# Patient Record
Sex: MALE | Race: WHITE | Employment: OTHER | URBAN - METROPOLITAN AREA
[De-identification: names, ages, dates, MRNs, and addresses within clinical notes are randomized per-mention and may not be internally consistent; named-entity substitution may affect disease eponyms.]

---

## 2017-01-04 ENCOUNTER — OFFICE VISIT (OUTPATIENT)
Dept: INTERNAL MEDICINE | Facility: CLINIC | Age: 67
End: 2017-01-04
Payer: COMMERCIAL

## 2017-01-04 VITALS
WEIGHT: 205 LBS | TEMPERATURE: 98.4 F | OXYGEN SATURATION: 97 % | SYSTOLIC BLOOD PRESSURE: 136 MMHG | DIASTOLIC BLOOD PRESSURE: 74 MMHG | HEART RATE: 88 BPM | HEIGHT: 69 IN | BODY MASS INDEX: 30.36 KG/M2

## 2017-01-04 DIAGNOSIS — Z23 ENCOUNTER FOR IMMUNIZATION: ICD-10-CM

## 2017-01-04 DIAGNOSIS — Z87.19 HISTORY OF BARRETT'S ESOPHAGUS: ICD-10-CM

## 2017-01-04 DIAGNOSIS — K52.9 GASTROENTERITIS: Primary | ICD-10-CM

## 2017-01-04 PROCEDURE — 90471 IMMUNIZATION ADMIN: CPT | Performed by: INTERNAL MEDICINE

## 2017-01-04 PROCEDURE — 90670 PCV13 VACCINE IM: CPT | Performed by: INTERNAL MEDICINE

## 2017-01-04 PROCEDURE — 99213 OFFICE O/P EST LOW 20 MIN: CPT | Mod: 25 | Performed by: INTERNAL MEDICINE

## 2017-01-04 ASSESSMENT — ANXIETY QUESTIONNAIRES
6. BECOMING EASILY ANNOYED OR IRRITABLE: NEARLY EVERY DAY
2. NOT BEING ABLE TO STOP OR CONTROL WORRYING: NOT AT ALL
3. WORRYING TOO MUCH ABOUT DIFFERENT THINGS: NOT AT ALL
5. BEING SO RESTLESS THAT IT IS HARD TO SIT STILL: NOT AT ALL
GAD7 TOTAL SCORE: 3
1. FEELING NERVOUS, ANXIOUS, OR ON EDGE: NOT AT ALL
IF YOU CHECKED OFF ANY PROBLEMS ON THIS QUESTIONNAIRE, HOW DIFFICULT HAVE THESE PROBLEMS MADE IT FOR YOU TO DO YOUR WORK, TAKE CARE OF THINGS AT HOME, OR GET ALONG WITH OTHER PEOPLE: NOT DIFFICULT AT ALL
7. FEELING AFRAID AS IF SOMETHING AWFUL MIGHT HAPPEN: NOT AT ALL

## 2017-01-04 ASSESSMENT — PATIENT HEALTH QUESTIONNAIRE - PHQ9: 5. POOR APPETITE OR OVEREATING: NOT AT ALL

## 2017-01-04 NOTE — PROGRESS NOTES
"  SUBJECTIVE:                                                    Chema Ames is a 66 year old male who presents to clinic today for the following health issues:    Medication Followup of Omperazole    Taking Medication as prescribed: yes    Side Effects:  None     Medication Helping Symptoms:  yes           Problem list and histories reviewed & adjusted, as indicated.  Additional history:     Patient also here for follow-up hypertension after starting amlodipine this past fall.  Today's blood pressure noted.    ROS:  Constitutional, HEENT, cardiovascular, pulmonary, gi and gu systems are negative, except as otherwise noted.    OBJECTIVE:                                                    /74 mmHg  Pulse 88  Temp(Src) 98.4  F (36.9  C) (Oral)  Ht 5' 9\" (1.753 m)  Wt 205 lb (92.987 kg)  BMI 30.26 kg/m2  SpO2 97%  Body mass index is 30.26 kg/(m^2).  GENERAL: healthy, alert and no distress  NECK: no adenopathy, no asymmetry, masses, or scars and thyroid normal to palpation  RESP: lungs clear to auscultation - no rales, rhonchi or wheezes  CV: regular rate and rhythm, normal S1 S2, no S3 or S4, no murmur, click or rub, no peripheral edema and peripheral pulses strong  ABDOMEN: soft, nontender, no hepatosplenomegaly, no masses and bowel sounds normal  MS: no gross musculoskeletal defects noted, no edema    Diagnostic Test Results:  none      ASSESSMENT/PLAN:                                                      1. Gastroenteritis    - omeprazole (PRILOSEC) 20 MG CR capsule; Take 1 capsule (20 mg) by mouth daily  Dispense: 90 capsule; Refill: prn    2. History of Doyle's esophagus      3. Encounter for immunization    - PNEUMOCOCCAL CONJ VACCINE 13 VALENT IM (PREVNAR 13)        Josh Tai MD  [facility]    "

## 2017-01-04 NOTE — NURSING NOTE
"Chief Complaint   Patient presents with     Gastrophageal Reflux     med refill       Initial /74 mmHg  Pulse 88  Temp(Src) 98.4  F (36.9  C) (Oral)  Ht 5' 9\" (1.753 m)  Wt 205 lb (92.987 kg)  BMI 30.26 kg/m2  SpO2 97% Estimated body mass index is 30.26 kg/(m^2) as calculated from the following:    Height as of this encounter: 5' 9\" (1.753 m).    Weight as of this encounter: 205 lb (92.987 kg).  BP completed using cuff size: herman Mayorga CMA      "

## 2017-01-05 ASSESSMENT — PATIENT HEALTH QUESTIONNAIRE - PHQ9: SUM OF ALL RESPONSES TO PHQ QUESTIONS 1-9: 1

## 2017-01-05 ASSESSMENT — ANXIETY QUESTIONNAIRES: GAD7 TOTAL SCORE: 3

## 2017-04-28 DIAGNOSIS — I10 ESSENTIAL HYPERTENSION WITH GOAL BLOOD PRESSURE LESS THAN 140/90: ICD-10-CM

## 2017-04-28 RX ORDER — AMLODIPINE BESYLATE 10 MG/1
TABLET ORAL
Qty: 30 TABLET | Refills: 9 | Status: SHIPPED | OUTPATIENT
Start: 2017-04-28

## 2017-04-28 NOTE — TELEPHONE ENCOUNTER
amLODIPine (NORVASC) 10 MG tablet      Last Written Prescription Date: 12/12/2016  Last Fill Quantity: 30, # refills: 3    Last Office Visit with [facility], [facility] or [facility] prescribing provider:  1/04/2017   Future Office Visit:        BP Readings from Last 3 Encounters:   01/04/17 136/74   11/27/16 100/60   11/14/16 158/80

## 2017-06-05 DIAGNOSIS — I10 ESSENTIAL HYPERTENSION WITH GOAL BLOOD PRESSURE LESS THAN 140/90: Primary | ICD-10-CM

## 2017-06-05 NOTE — TELEPHONE ENCOUNTER
Reason for Call:  Medication or medication refill:    Do you use a [facility]?  Name of the pharmacy and phone number for the current request:  [facility] [address] [place] - [phone] Fax [phone]    Name of the medication requested lisinopril  40 mg once a day    Other request     Can we leave a detailed message on this number? YES    Phone number patient can be reached at: Home number on file [phone] (home)    Best Time anytime    Call taken on 6/5/2017 at 4:40 PM by Rose Ricketts

## 2017-06-06 RX ORDER — LISINOPRIL 40 MG/1
40 TABLET ORAL DAILY
Qty: 90 TABLET | Refills: 1 | Status: SHIPPED | OUTPATIENT
Start: 2017-06-06 | End: 2017-12-09

## 2017-06-06 NOTE — TELEPHONE ENCOUNTER
lisinopril (PRINIVIL,ZESTRIL) 40 MG tablet      Last Written Prescription Date: 8/15/2016  Last Fill Quantity: 90, # refills: 3    Last Office Visit with [facility], [facility] or [facility] prescribing provider:  1/4/2017   Future Office Visit:        BP Readings from Last 3 Encounters:   01/04/17 136/74   11/27/16 100/60   11/14/16 158/80

## 2017-06-23 ENCOUNTER — TELEPHONE (OUTPATIENT)
Dept: INTERNAL MEDICINE | Facility: CLINIC | Age: 67
End: 2017-06-23

## 2017-09-12 ENCOUNTER — TELEPHONE (OUTPATIENT)
Dept: INTERNAL MEDICINE | Facility: CLINIC | Age: 67
End: 2017-09-12

## 2017-09-12 NOTE — LETTER
[facility]  [address] [address]  [phone]  September 12, 2017    Chema Ames  [address]  [facility] [address]    Dear Chema,    I care about your health and have reviewed your health plan. I have reviewed your medical conditions, medication list, and lab results and am making recommendations based on this review, to better manage your health.    You are in particular need of attention regarding:  -Colon Cancer Screening    I am recommending that you:     -schedule a COLONOSCOPY to look for colon cancer (due every 10 years or 5 years in higher risk situations.)        Colon cancer is now the second leading cause of cancer-related deaths in the [place] for both men and women and there are over 130,000 new cases and 50,000 deaths per year from colon cancer.  Colonoscopies can prevent 90-95% of these deaths.  Problem lesions can be removed before they ever become cancer.  This test is not only looking for cancer, but also getting rid of precancerious lesions.    If you are under/uninsured, we recommend you contact the [employer] program. [employer] is a free colorectal cancer screening program that provides colonoscopies for eligible under/uninsured [place] men and women. If you are interested in receiving a free colonoscopy, please call [employer] at [phone] (mention code [employer]) to see if you re eligible.      If you do not wish to do a colonoscopy or cannot afford to do one, at this time, there is another option. It is called a FIT test or Fecal Immunochemical Occult Blood Test (take home stool sample kit).  It does not replace the colonoscopy for colorectal cancer screening, but it can detect hidden bleeding in the lower colon.  It does need to be repeated every year and if a positive result is obtained, you would be referred for a colonoscopy.          If you have completed either one of these tests  at another facility, please call with the details of when and where the tests were done and if they were normal or not. Or have the records sent to our clinic so that we can best coordinate your care.      Here is a list of Health Maintenance topics that are due now or due soon:  Health Maintenance Due   Topic Date Due     Hepatitis C Screening  11/14/1969     Colon Cancer Screening - every 10 years.  11/14/2001     AORTIC ANEURYSM SCREENING (SYSTEM ASSIGNED)  11/14/2016     Flu Vaccine - yearly  09/01/2017       Please call us at [phone] or [phone] (or use [employer]) to address the above recommendations.     Thank you for trusting [facility].  We appreciate the opportunity to serve you and look forward to supporting your healthcare needs in the future.    If you have (or plan to have) any of these tests done at a facility other than a [facility] or a [facility], please have the results from these tests sent to your primary physician at [facility]..    Healthy Regards,    Aaron Tai MD

## 2017-12-09 DIAGNOSIS — I10 ESSENTIAL HYPERTENSION WITH GOAL BLOOD PRESSURE LESS THAN 140/90: ICD-10-CM

## 2017-12-09 NOTE — LETTER
[facility]  [address] [address]  [phone]        February 5, 2019    Chema Ames  [address]  [facility] [address]              Dear Chema Ames    This is to remind you that your non-fasting labs is due.    You may call our office at [phone] to schedule an appointment.    Please disregard this notice if you have already had your labs drawn or made an appointment.        Sincerely,        Josh Tai MD

## 2017-12-09 NOTE — LETTER
[facility]  [address] [id number] [phone]            Chema Ames  [address]  [facility] [address]        December 11, 2017    Dear Chema,    While refilling your prescription today, we noticed that you are due to have labs drawn NONFASTING .  We will refill your prescription for 30 days, but a follow-up appointment must be made before any additional refills can be approved.     Taking care of your health is important to us and we look forward to seeing you in the near future.  Please call us at [phone] or [phone] (or use [email or web address]) to schedule an appointment.     Please disregard this notice if you have already made an appointment.    Sincerely,        [facility]

## 2017-12-11 RX ORDER — LISINOPRIL 40 MG/1
TABLET ORAL
Qty: 30 TABLET | Refills: 0 | Status: SHIPPED | OUTPATIENT
Start: 2017-12-11

## 2018-02-06 ENCOUNTER — RELEASE OF INFORMATION (OUTPATIENT)
Dept: INTERNAL MEDICINE | Facility: CLINIC | Age: 68
End: 2018-02-06

## 2019-03-11 ENCOUNTER — TELEPHONE (OUTPATIENT)
Dept: LAB | Facility: CLINIC | Age: 69
End: 2019-03-11

## 2019-12-02 ENCOUNTER — TRANSFERRED RECORDS (OUTPATIENT)
Dept: HEALTH INFORMATION MANAGEMENT | Facility: CLINIC | Age: 69
End: 2019-12-02

## 2019-12-10 ENCOUNTER — TRANSFERRED RECORDS (OUTPATIENT)
Dept: HEALTH INFORMATION MANAGEMENT | Facility: CLINIC | Age: 69
End: 2019-12-10

## 2019-12-30 ENCOUNTER — HOSPITAL ENCOUNTER (OUTPATIENT)
Dept: CARDIAC REHAB | Facility: CLINIC | Age: 69
End: 2019-12-30
Attending: INTERNAL MEDICINE
Payer: COMMERCIAL

## 2019-12-30 PROCEDURE — 40000116 ZZH STATISTIC OP CR VISIT: Performed by: OCCUPATIONAL THERAPIST

## 2019-12-30 PROCEDURE — 93798 PHYS/QHP OP CAR RHAB W/ECG: CPT | Performed by: OCCUPATIONAL THERAPIST

## 2019-12-30 PROCEDURE — 40000575 ZZH STATISTIC OP CARDIAC VISIT #2: Performed by: OCCUPATIONAL THERAPIST

## 2019-12-30 PROCEDURE — 93797 PHYS/QHP OP CAR RHAB WO ECG: CPT | Performed by: OCCUPATIONAL THERAPIST

## 2020-01-03 ENCOUNTER — HOSPITAL ENCOUNTER (OUTPATIENT)
Dept: CARDIAC REHAB | Facility: CLINIC | Age: 70
End: 2020-01-03
Attending: INTERNAL MEDICINE
Payer: COMMERCIAL

## 2020-01-03 PROCEDURE — 40000116 ZZH STATISTIC OP CR VISIT

## 2020-01-03 PROCEDURE — 93798 PHYS/QHP OP CAR RHAB W/ECG: CPT

## 2020-01-06 ENCOUNTER — HOSPITAL ENCOUNTER (OUTPATIENT)
Dept: CARDIAC REHAB | Facility: CLINIC | Age: 70
End: 2020-01-06
Attending: INTERNAL MEDICINE
Payer: COMMERCIAL

## 2020-01-06 PROCEDURE — 40000116 ZZH STATISTIC OP CR VISIT

## 2020-01-06 PROCEDURE — 93798 PHYS/QHP OP CAR RHAB W/ECG: CPT

## 2020-01-10 ENCOUNTER — HOSPITAL ENCOUNTER (OUTPATIENT)
Dept: CARDIAC REHAB | Facility: CLINIC | Age: 70
End: 2020-01-10
Attending: INTERNAL MEDICINE
Payer: COMMERCIAL

## 2020-01-10 PROCEDURE — 40000116 ZZH STATISTIC OP CR VISIT: Performed by: REHABILITATION PRACTITIONER

## 2020-01-10 PROCEDURE — 93798 PHYS/QHP OP CAR RHAB W/ECG: CPT | Performed by: REHABILITATION PRACTITIONER

## 2020-01-13 ENCOUNTER — HOSPITAL ENCOUNTER (OUTPATIENT)
Dept: CARDIAC REHAB | Facility: CLINIC | Age: 70
End: 2020-01-13
Attending: INTERNAL MEDICINE
Payer: COMMERCIAL

## 2020-01-13 PROCEDURE — 93797 PHYS/QHP OP CAR RHAB WO ECG: CPT | Mod: 59

## 2020-01-13 PROCEDURE — 93798 PHYS/QHP OP CAR RHAB W/ECG: CPT

## 2020-01-13 PROCEDURE — 40000575 ZZH STATISTIC OP CARDIAC VISIT #2

## 2020-01-13 PROCEDURE — 40000116 ZZH STATISTIC OP CR VISIT

## 2020-01-17 ENCOUNTER — HOSPITAL ENCOUNTER (OUTPATIENT)
Dept: CARDIAC REHAB | Facility: CLINIC | Age: 70
End: 2020-01-17
Attending: INTERNAL MEDICINE
Payer: COMMERCIAL

## 2020-01-17 PROCEDURE — 93798 PHYS/QHP OP CAR RHAB W/ECG: CPT

## 2020-01-17 PROCEDURE — 40000116 ZZH STATISTIC OP CR VISIT

## 2020-02-03 ENCOUNTER — HOSPITAL ENCOUNTER (OUTPATIENT)
Dept: CARDIAC REHAB | Facility: CLINIC | Age: 70
End: 2020-02-03
Attending: INTERNAL MEDICINE
Payer: COMMERCIAL

## 2020-02-03 PROCEDURE — 40000116 ZZH STATISTIC OP CR VISIT

## 2020-02-03 PROCEDURE — 93798 PHYS/QHP OP CAR RHAB W/ECG: CPT

## 2020-02-05 ENCOUNTER — HOSPITAL ENCOUNTER (OUTPATIENT)
Dept: CARDIAC REHAB | Facility: CLINIC | Age: 70
End: 2020-02-05
Attending: INTERNAL MEDICINE
Payer: COMMERCIAL

## 2020-02-05 PROCEDURE — 40000116 ZZH STATISTIC OP CR VISIT

## 2020-02-05 PROCEDURE — 40000575 ZZH STATISTIC OP CARDIAC VISIT #2

## 2020-02-05 PROCEDURE — 93797 PHYS/QHP OP CAR RHAB WO ECG: CPT

## 2020-02-05 PROCEDURE — 93798 PHYS/QHP OP CAR RHAB W/ECG: CPT

## 2020-02-12 ENCOUNTER — HOSPITAL ENCOUNTER (OUTPATIENT)
Dept: CARDIAC REHAB | Facility: CLINIC | Age: 70
End: 2020-02-12
Attending: INTERNAL MEDICINE
Payer: COMMERCIAL

## 2020-02-12 PROCEDURE — 40000575 ZZH STATISTIC OP CARDIAC VISIT #2

## 2020-02-12 PROCEDURE — 93797 PHYS/QHP OP CAR RHAB WO ECG: CPT

## 2020-02-12 PROCEDURE — 40000116 ZZH STATISTIC OP CR VISIT

## 2020-02-12 PROCEDURE — 93798 PHYS/QHP OP CAR RHAB W/ECG: CPT

## 2020-02-14 ENCOUNTER — HOSPITAL ENCOUNTER (OUTPATIENT)
Dept: CARDIAC REHAB | Facility: CLINIC | Age: 70
End: 2020-02-14
Attending: INTERNAL MEDICINE
Payer: COMMERCIAL

## 2020-02-14 PROCEDURE — 40000116 ZZH STATISTIC OP CR VISIT

## 2020-02-14 PROCEDURE — 93798 PHYS/QHP OP CAR RHAB W/ECG: CPT

## 2020-02-17 ENCOUNTER — HOSPITAL ENCOUNTER (OUTPATIENT)
Dept: CARDIAC REHAB | Facility: CLINIC | Age: 70
End: 2020-02-17
Attending: INTERNAL MEDICINE
Payer: COMMERCIAL

## 2020-02-17 PROCEDURE — 93797 PHYS/QHP OP CAR RHAB WO ECG: CPT

## 2020-02-17 PROCEDURE — 93798 PHYS/QHP OP CAR RHAB W/ECG: CPT

## 2020-02-17 PROCEDURE — 40000575 ZZH STATISTIC OP CARDIAC VISIT #2

## 2020-02-17 PROCEDURE — 40000116 ZZH STATISTIC OP CR VISIT

## 2020-02-21 ENCOUNTER — HOSPITAL ENCOUNTER (OUTPATIENT)
Dept: CARDIAC REHAB | Facility: CLINIC | Age: 70
End: 2020-02-21
Attending: INTERNAL MEDICINE
Payer: COMMERCIAL

## 2020-02-21 PROCEDURE — 93798 PHYS/QHP OP CAR RHAB W/ECG: CPT

## 2020-02-21 PROCEDURE — 40000116 ZZH STATISTIC OP CR VISIT

## 2020-02-24 ENCOUNTER — HOSPITAL ENCOUNTER (OUTPATIENT)
Dept: CARDIAC REHAB | Facility: CLINIC | Age: 70
End: 2020-02-24
Attending: INTERNAL MEDICINE
Payer: COMMERCIAL

## 2020-02-24 PROCEDURE — 40000116 ZZH STATISTIC OP CR VISIT

## 2020-02-24 PROCEDURE — 93798 PHYS/QHP OP CAR RHAB W/ECG: CPT

## 2020-02-26 ENCOUNTER — HOSPITAL ENCOUNTER (OUTPATIENT)
Dept: CARDIAC REHAB | Facility: CLINIC | Age: 70
End: 2020-02-26
Attending: INTERNAL MEDICINE
Payer: COMMERCIAL

## 2020-02-26 PROCEDURE — 93798 PHYS/QHP OP CAR RHAB W/ECG: CPT

## 2020-02-26 PROCEDURE — 40000116 ZZH STATISTIC OP CR VISIT

## 2020-02-28 ENCOUNTER — HOSPITAL ENCOUNTER (OUTPATIENT)
Dept: CARDIAC REHAB | Facility: CLINIC | Age: 70
End: 2020-02-28
Attending: INTERNAL MEDICINE
Payer: COMMERCIAL

## 2020-02-28 PROCEDURE — 40000116 ZZH STATISTIC OP CR VISIT

## 2020-02-28 PROCEDURE — 93798 PHYS/QHP OP CAR RHAB W/ECG: CPT

## 2020-03-02 ENCOUNTER — HOSPITAL ENCOUNTER (OUTPATIENT)
Dept: CARDIAC REHAB | Facility: CLINIC | Age: 70
End: 2020-03-02
Attending: INTERNAL MEDICINE
Payer: COMMERCIAL

## 2020-03-02 PROCEDURE — 40000116 ZZH STATISTIC OP CR VISIT

## 2020-03-02 PROCEDURE — 93798 PHYS/QHP OP CAR RHAB W/ECG: CPT

## 2020-03-02 PROCEDURE — 40000575 ZZH STATISTIC OP CARDIAC VISIT #2

## 2020-03-02 PROCEDURE — 93797 PHYS/QHP OP CAR RHAB WO ECG: CPT

## 2020-03-06 ENCOUNTER — HOSPITAL ENCOUNTER (OUTPATIENT)
Dept: CARDIAC REHAB | Facility: CLINIC | Age: 70
End: 2020-03-06
Attending: INTERNAL MEDICINE
Payer: COMMERCIAL

## 2020-03-06 PROCEDURE — 40000116 ZZH STATISTIC OP CR VISIT

## 2020-03-06 PROCEDURE — 93798 PHYS/QHP OP CAR RHAB W/ECG: CPT

## 2020-03-09 ENCOUNTER — HOSPITAL ENCOUNTER (OUTPATIENT)
Dept: CARDIAC REHAB | Facility: CLINIC | Age: 70
End: 2020-03-09
Attending: INTERNAL MEDICINE
Payer: COMMERCIAL

## 2020-03-09 PROCEDURE — 93798 PHYS/QHP OP CAR RHAB W/ECG: CPT

## 2020-03-09 PROCEDURE — 40000116 ZZH STATISTIC OP CR VISIT

## 2020-03-11 ENCOUNTER — HOSPITAL ENCOUNTER (OUTPATIENT)
Dept: CARDIAC REHAB | Facility: CLINIC | Age: 70
End: 2020-03-11
Attending: INTERNAL MEDICINE
Payer: COMMERCIAL

## 2020-03-11 PROCEDURE — 40000116 ZZH STATISTIC OP CR VISIT

## 2020-03-11 PROCEDURE — 93798 PHYS/QHP OP CAR RHAB W/ECG: CPT

## 2021-01-07 ENCOUNTER — TRANSFERRED RECORDS (OUTPATIENT)
Dept: HEALTH INFORMATION MANAGEMENT | Facility: CLINIC | Age: 71
End: 2021-01-07

## 2021-04-28 ENCOUNTER — TRANSFERRED RECORDS (OUTPATIENT)
Dept: HEALTH INFORMATION MANAGEMENT | Facility: CLINIC | Age: 71
End: 2021-04-28

## 2021-05-20 ENCOUNTER — TRANSFERRED RECORDS (OUTPATIENT)
Dept: HEALTH INFORMATION MANAGEMENT | Facility: CLINIC | Age: 71
End: 2021-05-20

## 2021-06-03 ENCOUNTER — TRANSCRIBE ORDERS (OUTPATIENT)
Dept: OTHER | Age: 71
End: 2021-06-03

## 2021-06-04 ENCOUNTER — TRANSCRIBE ORDERS (OUTPATIENT)
Dept: OTHER | Age: 71
End: 2021-06-04

## 2021-06-04 DIAGNOSIS — Z95.5 S/P DRUG ELUTING CORONARY STENT PLACEMENT: Primary | ICD-10-CM

## 2021-06-09 ENCOUNTER — TRANSFERRED RECORDS (OUTPATIENT)
Dept: HEALTH INFORMATION MANAGEMENT | Facility: CLINIC | Age: 71
End: 2021-06-09

## 2021-06-16 ENCOUNTER — HOSPITAL ENCOUNTER (OUTPATIENT)
Dept: CARDIAC REHAB | Facility: CLINIC | Age: 71
End: 2021-06-16
Payer: COMMERCIAL

## 2021-06-16 DIAGNOSIS — Z95.5 S/P DRUG ELUTING CORONARY STENT PLACEMENT: ICD-10-CM

## 2021-06-16 PROCEDURE — 93797 PHYS/QHP OP CAR RHAB WO ECG: CPT | Mod: XU

## 2021-06-16 PROCEDURE — 93798 PHYS/QHP OP CAR RHAB W/ECG: CPT

## 2021-06-21 ENCOUNTER — HOSPITAL ENCOUNTER (OUTPATIENT)
Dept: CARDIAC REHAB | Facility: CLINIC | Age: 71
End: 2021-06-21
Attending: INTERNAL MEDICINE
Payer: COMMERCIAL

## 2021-06-21 PROCEDURE — 93798 PHYS/QHP OP CAR RHAB W/ECG: CPT

## 2021-06-22 ENCOUNTER — OFFICE VISIT (OUTPATIENT)
Dept: UROLOGY | Facility: CLINIC | Age: 71
End: 2021-06-22
Payer: COMMERCIAL

## 2021-06-22 VITALS
BODY MASS INDEX: 30.06 KG/M2 | SYSTOLIC BLOOD PRESSURE: 138 MMHG | DIASTOLIC BLOOD PRESSURE: 70 MMHG | HEIGHT: 70 IN | WEIGHT: 210 LBS | HEART RATE: 84 BPM

## 2021-06-22 DIAGNOSIS — C61 PROSTATE CANCER (H): Primary | ICD-10-CM

## 2021-06-22 DIAGNOSIS — I25.810 CORONARY ARTERY DISEASE INVOLVING AUTOLOGOUS ARTERY CORONARY BYPASS GRAFT WITHOUT ANGINA PECTORIS: ICD-10-CM

## 2021-06-22 PROCEDURE — 99204 OFFICE O/P NEW MOD 45 MIN: CPT | Performed by: STUDENT IN AN ORGANIZED HEALTH CARE EDUCATION/TRAINING PROGRAM

## 2021-06-22 RX ORDER — ROSUVASTATIN CALCIUM 40 MG/1
40 TABLET, COATED ORAL
COMMUNITY
Start: 2021-06-17

## 2021-06-22 RX ORDER — OXYCODONE HYDROCHLORIDE 5 MG/1
TABLET ORAL
COMMUNITY

## 2021-06-22 RX ORDER — OMEPRAZOLE 20 MG/1
20 TABLET, DELAYED RELEASE ORAL
COMMUNITY

## 2021-06-22 RX ORDER — ASPIRIN 81 MG/1
81 TABLET, CHEWABLE ORAL
COMMUNITY
Start: 2019-12-05

## 2021-06-22 RX ORDER — CALCIUM CARBONATE 500(1250)
TABLET,CHEWABLE ORAL
COMMUNITY

## 2021-06-22 RX ORDER — NITROGLYCERIN 0.4 MG/1
0.4 TABLET SUBLINGUAL
COMMUNITY
Start: 2019-12-04

## 2021-06-22 RX ORDER — ALBUTEROL SULFATE 90 UG/1
AEROSOL, METERED RESPIRATORY (INHALATION)
COMMUNITY
Start: 2020-04-20

## 2021-06-22 RX ORDER — LISINOPRIL 5 MG/1
TABLET ORAL
COMMUNITY
Start: 2021-02-11

## 2021-06-22 RX ORDER — ETANERCEPT 50 MG/ML
SOLUTION SUBCUTANEOUS
COMMUNITY
Start: 2021-03-01

## 2021-06-22 RX ORDER — CARVEDILOL 12.5 MG/1
TABLET ORAL
COMMUNITY
Start: 2021-04-23

## 2021-06-22 RX ORDER — ACETAMINOPHEN 325 MG/1
325-650 TABLET ORAL
COMMUNITY
Start: 2019-12-18

## 2021-06-22 RX ORDER — CLOPIDOGREL BISULFATE 75 MG/1
TABLET ORAL
COMMUNITY
Start: 2021-05-27

## 2021-06-22 RX ORDER — FOLIC ACID 1 MG/1
TABLET ORAL
COMMUNITY
Start: 2021-05-20

## 2021-06-22 ASSESSMENT — PAIN SCALES - GENERAL: PAINLEVEL: NO PAIN (0)

## 2021-06-22 ASSESSMENT — MIFFLIN-ST. JEOR: SCORE: 1715.86

## 2021-06-22 NOTE — LETTER
6/22/2021       RE: Chema Ames  [address]  [facility] [address]     Dear Colleague,    Thank you for referring your patient, Chema Ames, to the [facility] [place] at [facility]. Please see a copy of my visit note below.          Chief Complaint:    Prostate Cancer           Consult or Referral:     Mr. Chema Ames is a 70 year old male seen at the request of Dr. Corbin.         History of Present Illness:     Chema Ames is a 70 year old male being seen for second opinion for prostate cancer.  Duration of problem: 2 months  Previous treatments: Prostate biopsy with [place] [facility]     accompanied by his spouse:  Reviewed previous notes from Dr. Morrow    Was being seen by urologist at outside hospital for a prostate nodule which was biopsied and found to have Dowell 3+4=7 prostate cancer in 3 out of 6 cores on the right side and Irving six cancer on one of the cores in the left side. He has significant cardiac history he has undergone multiple CABGs in the past  and recently has undergone a stenting with a drug-eluting stent in May he has been chronic anticoagulation with Plavix for almost 1 year. He is here to seek second opinion as he has been referred for hormones with radiation therapy possible.             Past Medical History:     Past Medical History:   Diagnosis Date     GERD with esophagitis      History of Doyle's esophagus      HTN (hypertension)      Mumps      Obstructive sleep apnea      Palpitations      Prostate cancer (H)             Past Surgical History:     Past Surgical History:   Procedure Laterality Date     CARDIAC SURGERY       HERNIA REPAIR, INGUINAL RT/LT  11/2010    Left     SURGICAL HISTORY OF -       Excision of BCC of scalp            Medications     Current Outpatient Medications   Medication     acetaminophen (TYLENOL) 325 MG tablet     albuterol (VENTOLIN HFA) 108 (90  Base) MCG/ACT inhaler     aspirin (ASA) 81 MG chewable tablet     carvedilol (COREG) 12.5 MG tablet     clopidogrel (PLAVIX) 75 MG tablet     Etanercept (ENBREL MINI) 50 MG/ML SOCT     folic acid (FOLVITE) 1 MG tablet     lisinopril (ZESTRIL) 5 MG tablet     methotrexate 2.5 MG tablet     nitroGLYcerin (NITROSTAT) 0.4 MG sublingual tablet     rosuvastatin (CRESTOR) 40 MG tablet     amLODIPine (NORVASC) 10 MG tablet     calcium carbonate 1250 (500 Ca) MG CHEW     Calcium Carbonate Antacid (TUMS PO)     lisinopril (PRINIVIL/ZESTRIL) 40 MG tablet     omeprazole (PRILOSEC OTC) 20 MG EC tablet     omeprazole (PRILOSEC) 20 MG CR capsule     oxyCODONE (ROXICODONE) 5 MG tablet     ranitidine (ZANTAC) 150 MG tablet     Sildenafil Citrate (VIAGRA PO)     No current facility-administered medications for this visit.             Family History:     Family History   Problem Relation Age of Onset     Coronary Artery Disease Father          age 63, heart disease     Breast Cancer Mother      Lymphoma Mother             Social History:     Social History     Socioeconomic History     Marital status:      Spouse name: Not on file     Number of children: 0     Years of education: Not on file     Highest education level: Not on file   Occupational History     Employer: SELF   Social Needs     Financial resource strain: Not on file     Food insecurity     Worry: Not on file     Inability: Not on file     Transportation needs     Medical: Not on file     Non-medical: Not on file   Tobacco Use     Smoking status: Former Smoker     Packs/day: 0.50     Years: 20.00     Pack years: 10.00     Quit date: 2/10/2009     Years since quittin.4     Smokeless tobacco: Never Used   Substance and Sexual Activity     Alcohol use: Yes     Alcohol/week: 0.0 standard drinks     Comment: Socially     Drug use: No     Sexual activity: Not Currently   Lifestyle     Physical activity     Days per week: Not on file     Minutes per session: Not  "on file     Stress: Not on file   Relationships     Social connections     Talks on phone: Not on file     Gets together: Not on file     Attends Voodoo service: Not on file     Active member of club or organization: Not on file     Attends meetings of clubs or organizations: Not on file     Relationship status: Not on file     Intimate partner violence     Fear of current or ex partner: Not on file     Emotionally abused: Not on file     Physically abused: Not on file     Forced sexual activity: Not on file   Other Topics Concern     Parent/sibling w/ CABG, MI or angioplasty before 65F 55M? Not Asked   Social History Narrative     Not on file            Allergies:   Hctz [hydrochlorothiazide]         Review of Systems:  From intake questionnaire     Skin: negative  Eyes: negative  Ears/Nose/Throat: negative  Respiratory: No shortness of breath, dyspnea on exertion, cough, or hemoptysis  Cardiovascular: No chest pain or palpitations  Gastrointestinal: negative; no nausea/vomiting, constipation or diarrhea  Genitourinary: as per HPI  Musculoskeletal: negative  Neurologic: negative  Psychiatric: negative  Hematologic/Lymphatic/Immunologic: negative  Endocrine: negative         Physical Exam:     Patient is a 70 year old  male   Vitals: Blood pressure 138/70, pulse 84, height 1.765 m (5' 9.5\"), weight 95.3 kg (210 lb).  Constitutional: Body mass index is 30.57 kg/m .  Alert, no acute distress, oriented, conversant  Eyes: no scleral icterus; extraocular muscles intact, moist conjunctivae  Neck: trachea midline, no thyromegaly  Ears/nose/mouth: throat/mouth:normal, good dentition  Respiratory: no respiratory distress, or pursed lip breathing  Cardiovascular: pulses strong and intact; no obvious jugular venous distension present  Gastrointestinal: soft, nontender, no organomegaly or masses,   Lymphatics: No inguinal adenopathy  Musculoskeletal: extremities normal, no peripheral edema  Skin: no suspicious lesions or " rashes  Neuro: Alert, oriented, speech and mentation normal  Psych: affect and mood normal, alert and oriented to person, place and time  Gait: Normal  : deferred,       Labs and Pathology:    The following labs were reviewed by me and discussed with the patient:    Significant for   Lab Results   Component Value Date    CR 1.02 11/07/2016    CR 1.16 08/19/2016    CR 0.77 02/10/2011    CR 0.82 12/18/2010    CR 0.80 12/15/2010     PSA: 2.42  Surgical pathology:  Irving six six cores on the left Menlo 3+4 = 73 cores on the right        Imaging:    The following imaging exams were independently viewed and interpreted by me and discussed with patient:  CT Scan Abd/Pelvis: Normal  Bone scan: Normal               Assessment and Plan:     Prostate cancer (H)  We discussed about the significance of favorable intermediate risk prostate cancer and the fact that he has significant cardiac disease limiting him for major surgical intervention for at least the next 3 months. He is already being seen in consult for hormone he reviewed by an outside neurologist and is here to seek second. I discussed with him about the options available to him including active surveillance surgery and radiation therapy as well as focal therapy. I advised him to get his pathology review here with stenosis get a decipher genomic testing on prostate biopsy specimen. I believe that if he undergoes  radiation therapy he may be a candidate not getting hormones in with you of his good risk disease but in the event there is a high yearly genomic classification additional hormones may be beneficial to him. We also discussed about benefits and the adverse effects of hormones and how that may have a significant impact on him.he is going to see a radiation oncologist very soon and I I believe this information will be helpful for him  - Pathology Consult; Future  - Decipher Prostate Cancer Test: Clinic Lab Order; Future    Coronary artery disease involving  autologous artery coronary bypass graft without angina pectoris          Plan:  Advised for decipher testing and review here at the emergency room    Orders  Orders Placed This Encounter   Procedures     Pathology Consult     Decipher Prostate Cancer Test: Clinic Lab Order       Anupam Frausto MD  [facility] [place]      ==========================    Additional Billing and Coding Information:  Review of external notes as documented above   Review of the result(s) of each unique test - PSA bone scan, CT    Independent interpretation of a test performed by another physician/other qualified health care professional (not separately reported) -surgical pathology    Discussion of management or test interpretation with external physician/other qualified healthcare professional/appropriate source -N/A    Diagnosis or treatment significantly limited by social determinants of health -N/A    35 minutes spent on the date of the encounter doing chart review, review of outside records, review of test results, interpretation of tests, patient visit, documentation and discussion with family     ==========================

## 2021-06-23 ENCOUNTER — HOSPITAL ENCOUNTER (OUTPATIENT)
Dept: CARDIAC REHAB | Facility: CLINIC | Age: 71
End: 2021-06-23
Attending: INTERNAL MEDICINE
Payer: COMMERCIAL

## 2021-06-23 PROCEDURE — 93798 PHYS/QHP OP CAR RHAB W/ECG: CPT | Performed by: REHABILITATION PRACTITIONER

## 2021-06-28 ENCOUNTER — HOSPITAL ENCOUNTER (OUTPATIENT)
Dept: CARDIAC REHAB | Facility: CLINIC | Age: 71
End: 2021-06-28
Attending: INTERNAL MEDICINE
Payer: COMMERCIAL

## 2021-06-28 PROCEDURE — 93798 PHYS/QHP OP CAR RHAB W/ECG: CPT

## 2021-06-30 ENCOUNTER — DOCUMENTATION ONLY (OUTPATIENT)
Dept: UROLOGY | Facility: CLINIC | Age: 71
End: 2021-06-30

## 2021-06-30 NOTE — PROGRESS NOTES
Action 06/30/2021 JTV 2:17pm   Action Taken CSS sent a request for Pathology Slides to [facility].        Action 07/08/2021 JTV 3:05pm   Action Taken CSs received Pathology slides from [facility]. 5 slides were accounted for and sent to Pathology on 5th floor for processing.

## 2021-07-04 NOTE — PROGRESS NOTES
Chief Complaint:    Prostate Cancer           Consult or Referral:     Mr. Chema Ames is a 70 year old male seen at the request of Dr. Corbin.         History of Present Illness:     Chema Ames is a 70 year old male being seen for second opinion for prostate cancer.  Duration of problem: 2 months  Previous treatments: Prostate biopsy with [facility]     accompanied by his spouse:  Reviewed previous notes from Dr. Morrow    Was being seen by urologist at outside hospital for a prostate nodule which was biopsied and found to have [place] 3+4=7 prostate cancer in 3 out of 6 cores on the right side and [place] six cancer on one of the cores in the left side. He has significant cardiac history he has undergone multiple CABGs in the past  and recently has undergone a stenting with a drug-eluting stent in May he has been chronic anticoagulation with Plavix for almost 1 year. He is here to seek second opinion as he has been referred for hormones with radiation therapy possible.             Past Medical History:     Past Medical History:   Diagnosis Date     GERD with esophagitis      History of Doyle's esophagus      HTN (hypertension)      Mumps      Obstructive sleep apnea      Palpitations      Prostate cancer (H)             Past Surgical History:     Past Surgical History:   Procedure Laterality Date     CARDIAC SURGERY       HERNIA REPAIR, INGUINAL RT/LT  11/2010    Left     SURGICAL HISTORY OF -       Excision of BCC of scalp            Medications     Current Outpatient Medications   Medication     acetaminophen (TYLENOL) 325 MG tablet     albuterol (VENTOLIN HFA) 108 (90 Base) MCG/ACT inhaler     aspirin (ASA) 81 MG chewable tablet     carvedilol (COREG) 12.5 MG tablet     clopidogrel (PLAVIX) 75 MG tablet     Etanercept (ENBREL MINI) 50 MG/ML SOCT     folic acid (FOLVITE) 1 MG tablet     lisinopril (ZESTRIL) 5 MG tablet     methotrexate 2.5 MG tablet     nitroGLYcerin (NITROSTAT) 0.4 MG  sublingual tablet     rosuvastatin (CRESTOR) 40 MG tablet     amLODIPine (NORVASC) 10 MG tablet     calcium carbonate 1250 (500 Ca) MG CHEW     Calcium Carbonate Antacid (TUMS PO)     lisinopril (PRINIVIL/ZESTRIL) 40 MG tablet     omeprazole (PRILOSEC OTC) 20 MG EC tablet     omeprazole (PRILOSEC) 20 MG CR capsule     oxyCODONE (ROXICODONE) 5 MG tablet     ranitidine (ZANTAC) 150 MG tablet     Sildenafil Citrate (VIAGRA PO)     No current facility-administered medications for this visit.             Family History:     Family History   Problem Relation Age of Onset     Coronary Artery Disease Father          age 63, heart disease     Breast Cancer Mother      Lymphoma Mother             Social History:     Social History     Socioeconomic History     Marital status:      Spouse name: Not on file     Number of children: 0     Years of education: Not on file     Highest education level: Not on file   Occupational History     Employer: SELF   Social Needs     Financial resource strain: Not on file     Food insecurity     Worry: Not on file     Inability: Not on file     Transportation needs     Medical: Not on file     Non-medical: Not on file   Tobacco Use     Smoking status: Former Smoker     Packs/day: 0.50     Years: 20.00     Pack years: 10.00     Quit date: 2/10/2009     Years since quittin.4     Smokeless tobacco: Never Used   Substance and Sexual Activity     Alcohol use: Yes     Alcohol/week: 0.0 standard drinks     Comment: Socially     Drug use: No     Sexual activity: Not Currently   Lifestyle     Physical activity     Days per week: Not on file     Minutes per session: Not on file     Stress: Not on file   Relationships     Social connections     Talks on phone: Not on file     Gets together: Not on file     Attends [employer] service: Not on file     Active member of club or organization: Not on file     Attends meetings of clubs or organizations: Not on file     Relationship status: Not on  "file     Intimate partner violence     Fear of current or ex partner: Not on file     Emotionally abused: Not on file     Physically abused: Not on file     Forced sexual activity: Not on file   Other Topics Concern     Parent/sibling w/ CABG, MI or angioplasty before 65F 55M? Not Asked   Social History Narrative     Not on file            Allergies:   Hctz [hydrochlorothiazide]         Review of Systems:  From intake questionnaire     Skin: negative  Eyes: negative  Ears/Nose/Throat: negative  Respiratory: No shortness of breath, dyspnea on exertion, cough, or hemoptysis  Cardiovascular: No chest pain or palpitations  Gastrointestinal: negative; no nausea/vomiting, constipation or diarrhea  Genitourinary: as per HPI  Musculoskeletal: negative  Neurologic: negative  Psychiatric: negative  Hematologic/Lymphatic/Immunologic: negative  Endocrine: negative         Physical Exam:     Patient is a 70 year old  male   Vitals: Blood pressure 138/70, pulse 84, height 1.765 m (5' 9.5\"), weight 95.3 kg (210 lb).  Constitutional: Body mass index is 30.57 kg/m .  Alert, no acute distress, oriented, conversant  Eyes: no scleral icterus; extraocular muscles intact, moist conjunctivae  Neck: trachea midline, no thyromegaly  Ears/nose/mouth: throat/mouth:normal, good dentition  Respiratory: no respiratory distress, or pursed lip breathing  Cardiovascular: pulses strong and intact; no obvious jugular venous distension present  Gastrointestinal: soft, nontender, no organomegaly or masses,   Lymphatics: No inguinal adenopathy  Musculoskeletal: extremities normal, no peripheral edema  Skin: no suspicious lesions or rashes  Neuro: Alert, oriented, speech and mentation normal  Psych: affect and mood normal, alert and oriented to person, place and time  Gait: Normal  : deferred,       Labs and Pathology:    The following labs were reviewed by me and discussed with the patient:    Significant for   Lab Results   Component Value Date    CR " 1.02 11/07/2016    CR 1.16 08/19/2016    CR 0.77 02/10/2011    CR 0.82 12/18/2010    CR 0.80 12/15/2010     PSA: 2.42  Surgical pathology:  [place] six six cores on the left [place] 3+4 = 73 cores on the right        Imaging:    The following imaging exams were independently viewed and interpreted by me and discussed with patient:  CT Scan Abd/Pelvis: Normal  Bone scan: Normal               Assessment and Plan:     Prostate cancer (H)  We discussed about the significance of favorable intermediate risk prostate cancer and the fact that he has significant cardiac disease limiting him for major surgical intervention for at least the next 3 months. He is already being seen in consult for hormone he reviewed by an outside neurologist and is here to seek second. I discussed with him about the options available to him including active surveillance surgery and radiation therapy as well as focal therapy. I advised him to get his pathology review here with stenosis get a decipher genomic testing on prostate biopsy specimen. I believe that if he undergoes  radiation therapy he may be a candidate not getting hormones in with you of his good risk disease but in the event there is a high yearly genomic classification additional hormones may be beneficial to him. We also discussed about benefits and the adverse effects of hormones and how that may have a significant impact on him.he is going to see a radiation oncologist very soon and I I believe this information will be helpful for him  - Pathology Consult; Future  - Decipher Prostate Cancer Test: Clinic Lab Order; Future    Coronary artery disease involving autologous artery coronary bypass graft without angina pectoris          Plan:  Advised for decipher testing and review here at the emergency room    Orders  Orders Placed This Encounter   Procedures     Pathology Consult     Decipher Prostate Cancer Test: Clinic Lab Order       Anupam Frausto MD  [facility] UROLOGY  [facility]      ==========================    Additional Billing and Coding Information:  Review of external notes as documented above   Review of the result(s) of each unique test - PSA bone scan, CT    Independent interpretation of a test performed by another physician/other qualified health care professional (not separately reported) -surgical pathology    Discussion of management or test interpretation with external physician/other qualified healthcare professional/appropriate source -N/A    Diagnosis or treatment significantly limited by social determinants of health -N/A    35 minutes spent on the date of the encounter doing chart review, review of outside records, review of test results, interpretation of tests, patient visit, documentation and discussion with family     ==========================

## 2021-07-09 PROCEDURE — 88321 CONSLTJ&REPRT SLD PREP ELSWR: CPT | Performed by: PATHOLOGY

## 2021-07-09 PROCEDURE — 999N001032 HC STATISTIC REVIEW OUTSIDE SLIDES TC 88321: Performed by: STUDENT IN AN ORGANIZED HEALTH CARE EDUCATION/TRAINING PROGRAM

## 2021-07-13 ENCOUNTER — HOSPITAL ENCOUNTER (OUTPATIENT)
Dept: CARDIAC REHAB | Facility: CLINIC | Age: 71
End: 2021-07-13
Attending: INTERNAL MEDICINE
Payer: COMMERCIAL

## 2021-07-13 PROCEDURE — 93798 PHYS/QHP OP CAR RHAB W/ECG: CPT

## 2021-07-14 LAB — COPATH REPORT: NORMAL

## 2021-07-15 ENCOUNTER — HOSPITAL ENCOUNTER (OUTPATIENT)
Dept: CARDIAC REHAB | Facility: CLINIC | Age: 71
End: 2021-07-15
Attending: INTERNAL MEDICINE
Payer: COMMERCIAL

## 2021-07-15 PROCEDURE — 93798 PHYS/QHP OP CAR RHAB W/ECG: CPT

## 2021-07-20 ENCOUNTER — HOSPITAL ENCOUNTER (OUTPATIENT)
Dept: CARDIAC REHAB | Facility: CLINIC | Age: 71
End: 2021-07-20
Attending: INTERNAL MEDICINE
Payer: COMMERCIAL

## 2021-07-20 PROCEDURE — 93798 PHYS/QHP OP CAR RHAB W/ECG: CPT

## 2021-07-21 ENCOUNTER — TELEPHONE (OUTPATIENT)
Dept: UROLOGY | Facility: CLINIC | Age: 71
End: 2021-07-21

## 2021-07-21 NOTE — TELEPHONE ENCOUNTER
I called patient back and we were disconnected I sent message to Dr pena's rn care coordinator about the decipher test and see if she can locate the results Merissa Pena, ANITA Staff Nurse

## 2021-08-11 ENCOUNTER — LAB (OUTPATIENT)
Dept: LAB | Facility: CLINIC | Age: 71
End: 2021-08-11
Payer: COMMERCIAL

## 2021-08-11 DIAGNOSIS — C61 ADENOCARCINOMA OF PROSTATE (H): Primary | ICD-10-CM

## 2021-08-24 LAB — BKR LAB AP ADDL TEST(S) ADDED: NORMAL
